# Patient Record
Sex: FEMALE | Race: WHITE
[De-identification: names, ages, dates, MRNs, and addresses within clinical notes are randomized per-mention and may not be internally consistent; named-entity substitution may affect disease eponyms.]

---

## 2017-05-26 ENCOUNTER — HOSPITAL ENCOUNTER (EMERGENCY)
Dept: HOSPITAL 47 - EC | Age: 22
Discharge: HOME | End: 2017-05-26
Payer: COMMERCIAL

## 2017-05-26 VITALS — RESPIRATION RATE: 18 BRPM

## 2017-05-26 VITALS — DIASTOLIC BLOOD PRESSURE: 96 MMHG | SYSTOLIC BLOOD PRESSURE: 157 MMHG | HEART RATE: 78 BPM | TEMPERATURE: 98.8 F

## 2017-05-26 DIAGNOSIS — E86.0: ICD-10-CM

## 2017-05-26 DIAGNOSIS — R06.02: ICD-10-CM

## 2017-05-26 DIAGNOSIS — R05: ICD-10-CM

## 2017-05-26 DIAGNOSIS — F17.200: ICD-10-CM

## 2017-05-26 DIAGNOSIS — R07.89: Primary | ICD-10-CM

## 2017-05-26 LAB
ALP SERPL-CCNC: 77 U/L (ref 38–126)
ALT SERPL-CCNC: 21 U/L (ref 9–52)
ANION GAP SERPL CALC-SCNC: 9 MMOL/L
APTT BLD: 25.1 SEC (ref 22–30)
AST SERPL-CCNC: 16 U/L (ref 14–36)
BASOPHILS # BLD AUTO: 0.1 K/UL (ref 0–0.2)
BASOPHILS NFR BLD AUTO: 1 %
BUN SERPL-SCNC: 22 MG/DL (ref 7–17)
CALCIUM SPEC-MCNC: 10 MG/DL (ref 8.4–10.2)
CH: 32.4
CHCM: 35.1
CHLORIDE SERPL-SCNC: 105 MMOL/L (ref 98–107)
CO2 SERPL-SCNC: 24 MMOL/L (ref 22–30)
EOSINOPHIL # BLD AUTO: 0.2 K/UL (ref 0–0.7)
EOSINOPHIL NFR BLD AUTO: 3 %
ERYTHROCYTE [DISTWIDTH] IN BLOOD BY AUTOMATED COUNT: 4.54 M/UL (ref 3.8–5.4)
ERYTHROCYTE [DISTWIDTH] IN BLOOD: 12.2 % (ref 11.5–15.5)
GLUCOSE SERPL-MCNC: 92 MG/DL (ref 74–99)
HCT VFR BLD AUTO: 41.9 % (ref 34–46)
HDW: 2.42
HGB BLD-MCNC: 14.7 GM/DL (ref 11.4–16)
INR PPP: 1.1 (ref ?–1.1)
LUC NFR BLD AUTO: 2 %
LYMPHOCYTES # SPEC AUTO: 2.1 K/UL (ref 1–4.8)
LYMPHOCYTES NFR SPEC AUTO: 24 %
MAGNESIUM SPEC-SCNC: 2 MG/DL (ref 1.6–2.3)
MCH RBC QN AUTO: 32.3 PG (ref 25–35)
MCHC RBC AUTO-ENTMCNC: 35 G/DL (ref 31–37)
MCV RBC AUTO: 92.4 FL (ref 80–100)
MONOCYTES # BLD AUTO: 0.5 K/UL (ref 0–1)
MONOCYTES NFR BLD AUTO: 5 %
NEUTROPHILS # BLD AUTO: 5.9 K/UL (ref 1.3–7.7)
NEUTROPHILS NFR BLD AUTO: 66 %
NON-AFRICAN AMERICAN GFR(MDRD): >60
PARTICLE COUNT: (no result)
PH UR: 7 [PH] (ref 5–8)
POTASSIUM SERPL-SCNC: 4.3 MMOL/L (ref 3.5–5.1)
PROT SERPL-MCNC: 7.6 G/DL (ref 6.3–8.2)
PT BLD: 10.7 SEC (ref 9–12)
SODIUM SERPL-SCNC: 138 MMOL/L (ref 137–145)
SP GR UR: 1.02 (ref 1–1.03)
SQUAMOUS UR QL AUTO: 19 /HPF (ref 0–4)
UA BILLING (MACRO VS. MICRO): (no result)
UROBILINOGEN UR QL STRIP: <2 MG/DL (ref ?–2)
WBC # BLD AUTO: 0.16 10*3/UL
WBC # BLD AUTO: 8.9 K/UL (ref 3.8–10.6)
WBC #/AREA URNS HPF: 11 /HPF (ref 0–5)
WBC (PEROX): 8.7

## 2017-05-26 PROCEDURE — 85025 COMPLETE CBC W/AUTO DIFF WBC: CPT

## 2017-05-26 PROCEDURE — 96361 HYDRATE IV INFUSION ADD-ON: CPT

## 2017-05-26 PROCEDURE — 81001 URINALYSIS AUTO W/SCOPE: CPT

## 2017-05-26 PROCEDURE — 93005 ELECTROCARDIOGRAM TRACING: CPT

## 2017-05-26 PROCEDURE — 85379 FIBRIN DEGRADATION QUANT: CPT

## 2017-05-26 PROCEDURE — 85610 PROTHROMBIN TIME: CPT

## 2017-05-26 PROCEDURE — 99285 EMERGENCY DEPT VISIT HI MDM: CPT

## 2017-05-26 PROCEDURE — 84484 ASSAY OF TROPONIN QUANT: CPT

## 2017-05-26 PROCEDURE — 85730 THROMBOPLASTIN TIME PARTIAL: CPT

## 2017-05-26 PROCEDURE — 71020: CPT

## 2017-05-26 PROCEDURE — 80053 COMPREHEN METABOLIC PANEL: CPT

## 2017-05-26 PROCEDURE — 83735 ASSAY OF MAGNESIUM: CPT

## 2017-05-26 PROCEDURE — 81025 URINE PREGNANCY TEST: CPT

## 2017-05-26 PROCEDURE — 96374 THER/PROPH/DIAG INJ IV PUSH: CPT

## 2017-05-26 PROCEDURE — 94640 AIRWAY INHALATION TREATMENT: CPT

## 2017-05-26 PROCEDURE — 36415 COLL VENOUS BLD VENIPUNCTURE: CPT

## 2017-05-26 NOTE — XR
EXAMINATION TYPE: XR chest 2V

 

DATE OF EXAM: 5/26/2017 8:37 PM

 

COMPARISON: NONE

 

HISTORY: Chest pain

 

TECHNIQUE:  Frontal and lateral views of the chest are obtained.

 

FINDINGS:  Heart and mediastinum are normal. Lungs are clear. Diaphragm is normal. Bony thorax is int
act. There are chest leads.

 

IMPRESSION:  Normal chest.

## 2017-05-26 NOTE — ED
Chest Pain HPI





- General


Chief Complaint: Chest Pain


Stated Complaint: Chest Pain


Time Seen by Provider: 05/26/17 19:47


Source: patient


Mode of arrival: EMS





- History of Present Illness


Initial Comments: 





This patient is a 22-year-old woman who presents with complaint of 3-4 days now 

of chest tightness.  She indicates it is across the entire chest.  She states 

that she had first noticed it at work.  She works at [employer] and again it 

came on 3-4 days ago.  She states that it is constant, mild to moderate, and 

she has not noted any worsening or relieving factors.  She has had a little bit 

of shortness of breath and she states she is coughing with some light green 

sputum.  Patient denies any anginal equivalent, no diaphoresis, nausea or 

vomiting, palpitations, lightheadedness or syncope.  The patient had gone to 

medic expess about this was given aspirin and sent here for further evaluation.


MD Complaint: chest pain


-: days(s)


Onset: during rest


Pain Location: left chest, right chest


Pain Radiation: none


Severity: mild


Quality: tightness





- Related Data


 Home Medications











 Medication  Instructions  Recorded  Confirmed


 


Aspirin-Acet-Caff [id number] 2 tab PO BID PRN 05/26/17 05/26/17





[Excedrin]   


 


Ibuprofen [Motrin] 800 mg PO DAILY PRN 05/26/17 05/26/17


 


diphenhydrAMINE HCL [Benadryl] 100 mg PO HS PRN 05/26/17 05/26/17








 Previous Rx's











 Medication  Instructions  Recorded


 


Albuterol Inhaler [Ventolin Hfa 1 - 2 puff INHALATION Q6HR PRN #1 05/26/17





Inhaler] inhaler 











 Allergies











Allergy/AdvReac Type Severity Reaction Status Date / Time


 


No Known Allergies Allergy   Verified 05/26/17 20:11














Review of Systems


ROS Statement: 


Those systems with pertinent positive or pertinent negative responses have been 

documented in the HPI.





ROS Other: All systems not noted in ROS Statement are negative.


Constitutional: Reports: fever.  Denies: chills


Respiratory: Reports: as per HPI, cough, dyspnea.  Denies: wheezes, hemoptysis


Cardiovascular: Reports: as per HPI, chest pain.  Denies: palpitations, dyspnea 

on exertion, orthopnea, edema, syncope


Gastrointestinal: Denies: abdominal pain, nausea, vomiting, melena, hematochezia


Genitourinary: Denies: dysuria, hematuria


Musculoskeletal: Denies: back pain


Skin: Denies: rash


Neurological: Denies: headache, weakness, numbness





EKG Findings





- EKG Comments:


EKG Findings:: The 12-lead EKG shows anus rhythm with what appear to be 

relatively frequent reentrant ventricular beats.  Rate is 83 bpm.





- EKG Results:


EKG: interpreted by WILLIED, sinus rhythm (Rate 83 bpm), normal axis, normal QRS, 

normal ST/T





Past Medical History


Past Medical History: No Reported History


History of Any Multi-Drug Resistant Organisms: None Reported


Past Surgical History: No Surgical Hx Reported


Past Psychological History: Anxiety


Smoking Status: Current every day smoker


Past Alcohol Use History: None Reported


Past Drug Use History: None Reported





General Exam


General appearance: alert, in no apparent distress


Head exam: Present: atraumatic, normocephalic


Eye exam: Present: normal appearance.  Absent: scleral icterus, conjunctival 

injection


ENT exam: Present: normal oropharynx


Neck exam: Present: normal inspection, full ROM


Respiratory exam: Present: normal lung sounds bilaterally.  Absent: respiratory 

distress, wheezes, rales, rhonchi, stridor


Cardiovascular Exam: Present: regular rate, normal rhythm, normal heart sounds.

  Absent: systolic murmur, diastolic murmur, rubs, gallop


GI/Abdominal exam: Present: soft, normal bowel sounds.  Absent: distended, 

tenderness, guarding, rebound, mass


Extremities exam: Present: normal inspection, normal capillary refill.  Absent: 

pedal edema, calf tenderness


Back exam: Present: normal inspection.  Absent: CVA tenderness (R), CVA 

tenderness (L)


Neurological exam: Present: alert


Psychiatric exam: Present: anxious


Skin exam: Present: warm, dry, intact, normal color.  Absent: rash





Course


 Vital Signs











  05/26/17 05/26/17 05/26/17





  19:45 20:59 21:07


 


Temperature 99.0 F  


 


Pulse Rate 43 L 90 90


 


Respiratory 18  





Rate   


 


Blood Pressure 123/67  


 


O2 Sat by Pulse 99  





Oximetry   














Disposition


Clinical Impression: 


 Chest pain, Dehydration





Disposition: HOME SELF-CARE


Condition: Good


Instructions:  Chest Pain (ED)


Prescriptions: 


Albuterol Inhaler [Ventolin Hfa Inhaler] 1 - 2 puff INHALATION Q6HR PRN #1 

inhaler


 PRN Reason: Wheezing


Referrals: 


None,Stated [Primary Care Provider] - 1-2 days


Ryan Zuniga MD [STAFF PHYSICIAN] - 1-2 days

## 2018-02-23 ENCOUNTER — HOSPITAL ENCOUNTER (OUTPATIENT)
Dept: HOSPITAL 47 - RADUSMAIN | Age: 23
Discharge: HOME | End: 2018-02-23
Payer: COMMERCIAL

## 2018-02-23 DIAGNOSIS — Z36.89: Primary | ICD-10-CM

## 2018-02-23 DIAGNOSIS — Z3A.12: ICD-10-CM

## 2018-02-23 LAB
ERYTHROCYTE [DISTWIDTH] IN BLOOD BY AUTOMATED COUNT: 4.02 M/UL (ref 3.8–5.4)
ERYTHROCYTE [DISTWIDTH] IN BLOOD: 11.2 % (ref 11.5–15.5)
GLUCOSE SERPL-MCNC: 51 MG/DL (ref 74–99)
HCT VFR BLD AUTO: 38 % (ref 34–46)
HGB BLD-MCNC: 12.5 GM/DL (ref 11.4–16)
MCH RBC QN AUTO: 31 PG (ref 25–35)
MCHC RBC AUTO-ENTMCNC: 32.8 G/DL (ref 31–37)
MCV RBC AUTO: 94.4 FL (ref 80–100)
PLATELET # BLD AUTO: 299 K/UL (ref 150–450)
WBC # BLD AUTO: 10.8 K/UL (ref 3.8–10.6)

## 2018-02-23 PROCEDURE — 76801 OB US < 14 WKS SINGLE FETUS: CPT

## 2018-02-23 PROCEDURE — 87340 HEPATITIS B SURFACE AG IA: CPT

## 2018-02-23 PROCEDURE — 86901 BLOOD TYPING SEROLOGIC RH(D): CPT

## 2018-02-23 PROCEDURE — 86777 TOXOPLASMA ANTIBODY: CPT

## 2018-02-23 PROCEDURE — 86900 BLOOD TYPING SEROLOGIC ABO: CPT

## 2018-02-23 PROCEDURE — 85027 COMPLETE CBC AUTOMATED: CPT

## 2018-02-23 PROCEDURE — 82565 ASSAY OF CREATININE: CPT

## 2018-02-23 PROCEDURE — 76813 OB US NUCHAL MEAS 1 GEST: CPT

## 2018-02-23 PROCEDURE — 82947 ASSAY GLUCOSE BLOOD QUANT: CPT

## 2018-02-23 PROCEDURE — 86778 TOXOPLASMA ANTIBODY IGM: CPT

## 2018-02-23 PROCEDURE — 86592 SYPHILIS TEST NON-TREP QUAL: CPT

## 2018-02-23 PROCEDURE — 86762 RUBELLA ANTIBODY: CPT

## 2018-02-23 PROCEDURE — 86850 RBC ANTIBODY SCREEN: CPT

## 2018-02-24 NOTE — US
EXAMINATION TYPE: US OB <= 14 wk fetus

 

DATE OF EXAM: 2/23/2018

 

COMPARISON: NONE

 

CLINICAL HISTORY: Z36 confirm dates. Confirm dates 

 

EXAM PERFORMED:  Transabdominal (TA)

 

EXAM MEASUREMENTS:

 

GESTATIONAL AGE / DATING

 

Physician Established: Not yet established 

Dates by LMP: (6 weeks/1 days)  

** EDC: 10/18/18

Dates by First Scan:  No previous this is first scan  

Dates by Current Scan for: (12 weeks/0 days)  

** EDC: 09/07/18

 

MATERNAL ANATOMY 

 

Uterus: 10.6 x 6.3 x 8.8cm

Right Ovary: 2.0 x 0.8 x 1.3cm

Left Ovary: 3.2 x 1.3 x 1.6cm

Post CDS / Adnexa: appears wnl, large amount of peristalsing bowel left adnexa 

Presence of free fluid: no

Presence of corpus luteal cyst: yes, hypoechoic area right ovary = 1.4 x 1.4 x 1.4cm  

 

 

GESTATION / FETAL SURVEY

 

CRL: 5.5cm (12 weeks/0 days)

Yolk Sac (normal less than 6mm): not seen

Heart Rate: 160 bpm

Rhythm:  Normal

IUP:  Viable IUP

Nuchal Translucency 10-14wks (normal less than 3mm): 1.7mm

 

Date of LMP: unknown - possibly 01/11/18 

Beta HcG (if available): Not available at this time

 

Single live intrauterine gestation is seen in the gestational sac and fetal pole is identified. Yolk 
sac is not clearly present. No free fluid is seen in pelvic cul-de-sac.

 

Both ovaries are identified. Within the right ovary there is a peripheral 1.4 cm hypoechoic oval lesi
on felt to reflect corpus luteal cyst. No suspicious extra ovarian adnexal masses are identified.

 

IMPRESSION:

Single live intrauterine gestation is confirmed, mean crown-rump length is 5.5 cm corresponding to 12
 weeks 0 day old fetus.

## 2018-02-27 LAB
T GONDII IGG SER-ACNC: <3 IU/ML (ref ?–7.2)
T GONDII IGM SER-ACNC: 3.2 AU/ML (ref ?–8)

## 2018-09-09 ENCOUNTER — HOSPITAL ENCOUNTER (INPATIENT)
Dept: HOSPITAL 47 - FBPOP | Age: 23
LOS: 1 days | Discharge: HOME | End: 2018-09-10
Attending: OBSTETRICS & GYNECOLOGY | Admitting: OBSTETRICS & GYNECOLOGY
Payer: COMMERCIAL

## 2018-09-09 VITALS — RESPIRATION RATE: 16 BRPM

## 2018-09-09 DIAGNOSIS — Z3A.40: ICD-10-CM

## 2018-09-09 DIAGNOSIS — O48.0: Primary | ICD-10-CM

## 2018-09-09 DIAGNOSIS — F17.200: ICD-10-CM

## 2018-09-09 DIAGNOSIS — F41.9: ICD-10-CM

## 2018-09-09 LAB
BASOPHILS # BLD AUTO: 0.1 K/UL (ref 0–0.2)
BASOPHILS NFR BLD AUTO: 0 %
EOSINOPHIL # BLD AUTO: 0.1 K/UL (ref 0–0.7)
EOSINOPHIL NFR BLD AUTO: 1 %
ERYTHROCYTE [DISTWIDTH] IN BLOOD BY AUTOMATED COUNT: 4.01 M/UL (ref 3.8–5.4)
ERYTHROCYTE [DISTWIDTH] IN BLOOD: 12.6 % (ref 11.5–15.5)
HCT VFR BLD AUTO: 37.3 % (ref 34–46)
HGB BLD-MCNC: 12.8 GM/DL (ref 11.4–16)
LYMPHOCYTES # SPEC AUTO: 3 K/UL (ref 1–4.8)
LYMPHOCYTES NFR SPEC AUTO: 27 %
MCH RBC QN AUTO: 31.8 PG (ref 25–35)
MCHC RBC AUTO-ENTMCNC: 34.2 G/DL (ref 31–37)
MCV RBC AUTO: 92.8 FL (ref 80–100)
MONOCYTES # BLD AUTO: 0.7 K/UL (ref 0–1)
MONOCYTES NFR BLD AUTO: 6 %
NEUTROPHILS # BLD AUTO: 6.8 K/UL (ref 1.3–7.7)
NEUTROPHILS NFR BLD AUTO: 62 %
PLATELET # BLD AUTO: 270 K/UL (ref 150–450)
WBC # BLD AUTO: 11.1 K/UL (ref 3.8–10.6)

## 2018-09-09 PROCEDURE — 59025 FETAL NON-STRESS TEST: CPT

## 2018-09-09 PROCEDURE — 88307 TISSUE EXAM BY PATHOLOGIST: CPT

## 2018-09-09 PROCEDURE — 00HU33Z INSERTION OF INFUSION DEVICE INTO SPINAL CANAL, PERCUTANEOUS APPROACH: ICD-10-PCS

## 2018-09-09 PROCEDURE — 84112 EVAL AMNIOTIC FLUID PROTEIN: CPT

## 2018-09-09 PROCEDURE — 85025 COMPLETE CBC W/AUTO DIFF WBC: CPT

## 2018-09-09 PROCEDURE — 0HQ9XZZ REPAIR PERINEUM SKIN, EXTERNAL APPROACH: ICD-10-PCS

## 2018-09-09 PROCEDURE — 3E0R3BZ INTRODUCTION OF ANESTHETIC AGENT INTO SPINAL CANAL, PERCUTANEOUS APPROACH: ICD-10-PCS

## 2018-09-09 PROCEDURE — 99213 OFFICE O/P EST LOW 20 MIN: CPT

## 2018-09-09 RX ADMIN — IBUPROFEN PRN MG: 600 TABLET ORAL at 12:44

## 2018-09-09 RX ADMIN — POTASSIUM CHLORIDE SCH MLS/HR: 14.9 INJECTION, SOLUTION INTRAVENOUS at 07:11

## 2018-09-09 RX ADMIN — POTASSIUM CHLORIDE SCH MLS/HR: 14.9 INJECTION, SOLUTION INTRAVENOUS at 05:06

## 2018-09-09 RX ADMIN — DOCUSATE SODIUM AND SENNOSIDES SCH EACH: 50; 8.6 TABLET ORAL at 19:19

## 2018-09-09 RX ADMIN — POTASSIUM CHLORIDE SCH MLS/HR: 14.9 INJECTION, SOLUTION INTRAVENOUS at 06:23

## 2018-09-09 RX ADMIN — POTASSIUM CHLORIDE SCH MLS/HR: 14.9 INJECTION, SOLUTION INTRAVENOUS at 04:44

## 2018-09-09 RX ADMIN — IBUPROFEN PRN MG: 600 TABLET ORAL at 19:20

## 2018-09-09 NOTE — P.PROBDLV
Vaginal Delivery Note





- .


Vaginal Delivery Note: 





Normal vaginal delivery viable male infant Apgars 8 and 9 at 1013 hrs.





Please see dictated H&P for intimate details of this patient's admission.  

Brief summary this is a 23-year-old  2 para 1 female 40-2/7 weeks 

gestation admitted to labor and delivery with complaints of regular painful 

contractions.  On admission patient is 2-3 cm dilated and has artificial 

rupture membranes for moderate meconium-stained fluid.  Fetal heart tones are 

reactive.  Patient does get an epidural for pain control.  Patient does stay at 

4 cm for an hour to and therefore does have some Pitocin augmentation of labor.

  Labor thereafter progresses quickly patient gets to complete.  She pushes 

approximately 3 times pushes the fetal head over the intact perineum.  

Pediatricians are present for delivery.  There is a very loose nuchal cord and 

patient has spontaneous delivery the anterior and posterior shoulder and rest 

this infant's body.  This infant has spontaneous respiration and good cry and 

grossly appears normal.  Apgars are 8 and 9 delivery time is 1013 hrs.  Infant 

is late on the mother's abdomen.  After the cord is done pulsating the 

umbilical cord is doubly clamped and cut and appears to be trivascular.  The 

placenta is then spontaneously delivered intact.  Of note is slight meconium-

stained.  Inspection of perineum shows a first-degree vaginal laceration the 

right side which is only about 2 cm but is reapproximated with 3-0 Vicryl usual 

fashion.  There are no perineal lacerations.  Infant and mother are stable 

delivery room.  There are no complications.  All counts are correct 3.

## 2018-09-09 NOTE — P.HPOB
History of Present Illness


H&P Date: 18


Chief Complaint: Contractions





This patient is a 23-year-old with a 2 para 1 female estimated date of 

confinement 2018 estimated gestational age 40-2/7 weeks who presents to 

labor and delivery with complaints of painful contractions.  Patient was seen 

by Dr. Moreno in the office was 1-2 cm dilated is now 2-3 cm dilated in active 

labor.  Prenatal care is complicated by tobacco use, otherwise appears 

uncomplicated.





Review of Systems


Gastrointestinal: Reports heartburn


Genitourinary: Reports pregnant


Menstruation: Reports amenorrhea





Past Medical History


Past Medical History: No Reported History


History of Any Multi-Drug Resistant Organisms: None Reported


Past Surgical History: No Surgical Hx Reported


Past Anesthesia/Blood Transfusion Reactions: No Reported Reaction


Past Psychological History: Anxiety


Smoking Status: Current every day smoker


Past Alcohol Use History: None Reported


Past Drug Use History: None Reported





- Past Family History


  ** Mother


Family Medical History: No Reported History





Medications and Allergies


 Home Medications











 Medication  Instructions  Recorded  Confirmed  Type


 


Pnv,Calcium 72/Iron/Folic Acid 1 each PO DAILY 18 History





[Prenatal Plus Tablet]    











 Allergies











Allergy/AdvReac Type Severity Reaction Status Date / Time


 


No Known Allergies Allergy   Verified 18 04:10














Exam


 Vital Signs











  Temp Pulse Resp BP


 


 18 04:26  97.8 F  102 H  18  118/69


 


 18 04:15  97.8 F  102 H  18  118/69








 Intake and Output











 18





 14:59 22:59 06:59


 


Other:   


 


  # Voids   1


 


  Weight   61.235 kg














- OBG Physical Exam


Abdomen: bowel sounds normal, no diffuse tenderness, no bruit present, no 

guarding noted, no hepatomegaly, no splenomegaly, no mass


Vulva: both: normal


Vagina: normal moisture, no discharge


Cervix: no lesion (Cervix is 2-3 cm dilated approximately 50% effaced.  

Artificial rupture membranes shows moderate meconium-stained fluid), no 

discharge


Uterus: enlarged





Results





Prenatal blood work shows she is oh positive, rubella immune, hepatitis B is 

negative, group B strep was negative.  Ultrasound has shown small for 

gestational age.





Assessment and Plan


Assessment: 





This is a 23-year-old  2 para 1 female 40-2/7 weeks gestation in early 

active labor with moderate meconium-stained fluid.  Plan is anticipate vaginal 

delivery.  We will alert the nurse anesthetist as to the meconium-stained 

fluid.  Fetal heart tones are reactive at this time.


(1) Post-dates pregnancy


Current Visit: Yes   Status: Acute   Code(s): O48.0 - POST-TERM PREGNANCY   

SNOMED Code(s): 82509606


   





(2) Meconium in amniotic fluid


Current Visit: Yes   Status: Acute   Code(s): P96.83 - MECONIUM STAINING   

SNOMED Code(s): 044654149

## 2018-09-10 VITALS — SYSTOLIC BLOOD PRESSURE: 122 MMHG | TEMPERATURE: 98.6 F | HEART RATE: 78 BPM | DIASTOLIC BLOOD PRESSURE: 63 MMHG

## 2018-09-10 RX ADMIN — DOCUSATE SODIUM AND SENNOSIDES SCH: 50; 8.6 TABLET ORAL at 09:39

## 2018-09-10 RX ADMIN — IBUPROFEN PRN MG: 600 TABLET ORAL at 10:15

## 2018-09-10 RX ADMIN — IBUPROFEN PRN MG: 600 TABLET ORAL at 01:58

## 2018-09-10 NOTE — P.MSEPDOC
Presenting Problems





- Arrival Data


Date of Arrival on Unit: 18


Time of Arrival on Unit: 04:22


Mode of Transport: Wheelchair





- Complaint


OB-Reason for Admission/Chief Complaint: Possible Onset of Labor


Comment: Contractions started at approximately 0100





Prenatal Medical History





- Pregnancy Information


: 2


Para: 1


Term: 1


: 0


Abortions: Spontaneous or Elective: 0


Number of Living Children: 1





- Gestational Age


Gestational Age by PERLA (wks/days): 40 Weeks and 2 Days





- Prenatal History


Pregnancy Complications: Smoker





Review of Systems





- Review of Systems


Constitutional: No problems


Breast: No problems


ENT: No problems


Cardiovascular: No problems


Respiratory: No problems


Gastrointestinal: No problems


Genitourinary: No problems


Musculoskeletal: No problems


Neurological: No problems


Skin: No problems





Vital Signs





- Temperature


Temperature: 97.9 F


Temperature Source: Oral





- Pulse


  ** Right


Pulse Rate: 67


Pulse Assessment Method: Automatic Cuff





- Respirations


Respiratory Rate: 16


Oxygen Delivery Method: Room Air





- Blood Pressure


  ** Right Arm


Blood Pressure: 141/77


Blood Pressure Mean: 98


Blood Pressure Source: Automatic Cuff





Medical Screen Scoring (Pre)





- Cervical Exam


Dilation: 1-3 cm = 1


Effacement: More than 50% = 2


Membranes: Intact





- Uterine Contractions


Frequency: > or = 36 weeks =2


Duration: > 40 seconds = 2


Intensity: N/A





- Maternal Vital Signs


Maternal Temperature: N/A


Maternal Blood Pressure: N/A


Signs of Preeclampsia: N/A


Maternal Respirations: N/A





- Pain Assessment


Pain Location and Character: Abdomen


Pain Scale Used: Numeric (1 - 10)


Pain Intensity: 10





- Maternal Trauma


Maternal Trauma: N/A





- Fetal Assessment


Baseline FHR: 125


Fetal Heart Rate - NICHD Category: Category I (Normal) = 0


NST: Reactive


Fetal Position: N/A


Fetal Station: N/A





- Total Score


Total Score (Pre): 7





- Level of Risk


Level of Risk: Medium (6-9)





Physician Notification (Pre)





- Physician Notified


Physician Notified Date: 18


Physician Notified Time: 04:22


Physician/Practitioner Notifed:: Dr. Lou


New Order Received: Yes





- Notification Comment


Comment: Admit patient for labor, patient may have an epidural if and when 

needed.





Disposition





- Disposition


OB Disposition: Admit, LDRP Suite


Transferred to:: Suite 3


I agree with the RN Medical Screening Exam: Yes


Risk & Benefit of care provided described in d/c instruction: Yes


Diagnosis: ENCOUNTER FOR FULL-TERM UNCOMPLICATED DELIVERY

## 2018-09-10 NOTE — P.DS
Providers


Date of admission: 


18 04:21





Expected date of discharge: 09/10/18


Attending physician: 


Olive Moreno





Primary care physician: 


Stated None





Hospital Course: 





This is a 23-year-old female  2 para 1 at 40-2/7 weeks who presented 

with active labor.  She delivered vaginally a viable male infant on 2018 

with Apgar scores of 8 at 1 minute and 9 at 5 minutes and infant weight of 6 

lbs. 0 oz.  Please see dictated delivery note for more details.  Her postpartum 

course has been uncomplicated.  She is bottle feeding.  Lochia is decreasing.  

Pain is fairly well controlled with ibuprofen.  Vital signs are stable.  

Abdomen is soft with fundus firm and nontender.  Extremities show negative 

Homans.  Impression is status post vaginal delivery postpartum day #1.  Plan is 

to discharge home today.  Routine postpartum instructions are given.  She is 

will be given a prescription for ibuprofen.  She is advised to follow up in the 

office in 6 weeks for a postpartum check.  She is advised to call the office if 

she has any further questions or concerns prior to her appointment time.


Procedures: 





Spontaneous vaginal delivery of a viable male infant on 2018


Patient Condition at Discharge: Stable





Plan - Discharge Summary


New Discharge Prescriptions: 


New


   Ibuprofen [Motrin] 600 mg PO Q6HR PRN #60 tab


     PRN Reason: Mild Pain Or Fever >= 100.5





Continue


   Pnv,Calcium 72/Iron/Folic Acid [Prenatal Plus Tablet] 1 each PO DAILY


Discharge Medication List





Pnv,Calcium 72/Iron/Folic Acid [Prenatal Plus Tablet] 1 each PO DAILY 18 [

History]


Ibuprofen [Motrin] 600 mg PO Q6HR PRN #60 tab 09/10/18 [Rx]








Follow up Appointment(s)/Referral(s): 


Olive Moreno DO [Doctor of Osteopathic Medicine] - 6 Weeks


Activity/Diet/Wound Care/Special Instructions: 


Postpartum Instructions





1.  Do not begin any exercise program for 3 weeks.


2.  Do not resume sexual relations for 3 weeks or longer if uncomfortable.


3.  You may take tub baths or showers at any time.


4.  You may use tampons if desired after 3 weeks.


5.  Keep the area of episiotomy (stitches) clean and dry.


6.  If you are not nursing, wear a good fitting, supportive bra during the day 

and limit fluid intake for at least 1 week to prevent breast engorgement.


7.  Call the office, [phone], within the next week to make appointment for 

your 6 week checkup if it has not already been made.


8.  Report any of the following occurrences to the doctor promptly:


     a.  Heavy, excessive bleeding


     b.  Chills, fever


     c.  Burning or frequency of urination


     d.  Pain or redness and breasts if nursing


     e.  Increasing pain or swelling in episiotomy (stitches).











In addition to the above instructions, the following additional should be 

followed:


1.  No heavy lifting or straining (exercising) until after 6 week checkup.


2.  Keep abdominal incision clean and dry: You may wear a dressing if more 

comfortable.


3.  Make office appointment for 10 days after going home or as instructed by 

her doctor.





Discharge Disposition: HOME SELF-CARE

## 2022-05-23 ENCOUNTER — HOSPITAL ENCOUNTER (INPATIENT)
Dept: HOSPITAL 47 - FBPOP | Age: 27
LOS: 2 days | Discharge: HOME | End: 2022-05-25
Attending: OBSTETRICS & GYNECOLOGY | Admitting: OBSTETRICS & GYNECOLOGY
Payer: COMMERCIAL

## 2022-05-23 DIAGNOSIS — F41.9: ICD-10-CM

## 2022-05-23 DIAGNOSIS — Z3A.40: ICD-10-CM

## 2022-05-23 DIAGNOSIS — F31.9: ICD-10-CM

## 2022-05-23 DIAGNOSIS — F17.210: ICD-10-CM

## 2022-05-23 LAB
BASOPHILS # BLD AUTO: 0 K/UL (ref 0–0.2)
BASOPHILS NFR BLD AUTO: 0 %
EOSINOPHIL # BLD AUTO: 0.1 K/UL (ref 0–0.7)
EOSINOPHIL NFR BLD AUTO: 0 %
ERYTHROCYTE [DISTWIDTH] IN BLOOD BY AUTOMATED COUNT: 3.92 M/UL (ref 3.8–5.4)
ERYTHROCYTE [DISTWIDTH] IN BLOOD: 13 % (ref 11.5–15.5)
HCT VFR BLD AUTO: 37.1 % (ref 34–46)
HGB BLD-MCNC: 12.8 GM/DL (ref 11.4–16)
LYMPHOCYTES # SPEC AUTO: 1.9 K/UL (ref 1–4.8)
LYMPHOCYTES NFR SPEC AUTO: 14 %
MCH RBC QN AUTO: 32.7 PG (ref 25–35)
MCHC RBC AUTO-ENTMCNC: 34.5 G/DL (ref 31–37)
MCV RBC AUTO: 94.6 FL (ref 80–100)
MONOCYTES # BLD AUTO: 0.6 K/UL (ref 0–1)
MONOCYTES NFR BLD AUTO: 4 %
NEUTROPHILS # BLD AUTO: 10 K/UL (ref 1.3–7.7)
NEUTROPHILS NFR BLD AUTO: 79 %
PLATELET # BLD AUTO: 241 K/UL (ref 150–450)
WBC # BLD AUTO: 12.8 K/UL (ref 3.8–10.6)

## 2022-05-23 PROCEDURE — 85025 COMPLETE CBC W/AUTO DIFF WBC: CPT

## 2022-05-23 PROCEDURE — 86901 BLOOD TYPING SEROLOGIC RH(D): CPT

## 2022-05-23 PROCEDURE — 59025 FETAL NON-STRESS TEST: CPT

## 2022-05-23 PROCEDURE — 86900 BLOOD TYPING SEROLOGIC ABO: CPT

## 2022-05-23 PROCEDURE — 99213 OFFICE O/P EST LOW 20 MIN: CPT

## 2022-05-23 PROCEDURE — 86850 RBC ANTIBODY SCREEN: CPT

## 2022-05-23 RX ADMIN — POTASSIUM CHLORIDE SCH MLS/HR: 14.9 INJECTION, SOLUTION INTRAVENOUS at 23:00

## 2022-05-24 PROCEDURE — 10907ZC DRAINAGE OF AMNIOTIC FLUID, THERAPEUTIC FROM PRODUCTS OF CONCEPTION, VIA NATURAL OR ARTIFICIAL OPENING: ICD-10-PCS

## 2022-05-24 PROCEDURE — 4A0HXCZ MEASUREMENT OF PRODUCTS OF CONCEPTION, CARDIAC RATE, EXTERNAL APPROACH: ICD-10-PCS

## 2022-05-24 PROCEDURE — 3E033VJ INTRODUCTION OF OTHER HORMONE INTO PERIPHERAL VEIN, PERCUTANEOUS APPROACH: ICD-10-PCS

## 2022-05-24 RX ADMIN — POTASSIUM CHLORIDE SCH MLS/HR: 14.9 INJECTION, SOLUTION INTRAVENOUS at 08:41

## 2022-05-24 RX ADMIN — FAMOTIDINE SCH MG: 10 INJECTION, SOLUTION INTRAVENOUS at 08:33

## 2022-05-24 RX ADMIN — IBUPROFEN PRN MG: 600 TABLET ORAL at 15:14

## 2022-05-24 RX ADMIN — POTASSIUM CHLORIDE SCH MLS/HR: 14.9 INJECTION, SOLUTION INTRAVENOUS at 12:08

## 2022-05-24 RX ADMIN — ACETAMINOPHEN PRN MG: 325 TABLET, FILM COATED ORAL at 18:31

## 2022-05-24 RX ADMIN — BUTORPHANOL TARTRATE PRN MG: 1 INJECTION, SOLUTION INTRAMUSCULAR; INTRAVENOUS at 07:59

## 2022-05-24 RX ADMIN — FAMOTIDINE SCH MG: 10 INJECTION, SOLUTION INTRAVENOUS at 00:29

## 2022-05-24 RX ADMIN — POTASSIUM CHLORIDE SCH MLS/HR: 14.9 INJECTION, SOLUTION INTRAVENOUS at 01:26

## 2022-05-24 RX ADMIN — DOCUSATE SODIUM AND SENNOSIDES SCH EACH: 50; 8.6 TABLET ORAL at 22:34

## 2022-05-24 RX ADMIN — IBUPROFEN PRN MG: 600 TABLET ORAL at 22:35

## 2022-05-24 RX ADMIN — BUTORPHANOL TARTRATE PRN MG: 1 INJECTION, SOLUTION INTRAMUSCULAR; INTRAVENOUS at 01:25

## 2022-05-24 NOTE — P.PROBDLV
Vaginal Delivery Note





- .


Vaginal Delivery Note: 





The patient breast to complete dilation after oxytocin augmentation of labor and

artificial rupture of membranes with clear fluid noted.  She did receive 

epidural anesthesia.  Once reaching complete, she began pushing.  Infant's head 

came to a crown.  With one further push, the infant's head delivered across the 

perineum followed by the anterior shoulder.  Nuchal cord times one was reduced 

around the infant's head.  The remainder the infant easily delivered after that.

 Nose and mouth were bulb suctioned after delivery.  Infant was placed on 

mother's abdomen.  Cord was clamped and cut.  Infant was taken to warmer for 

evaluation.  A viable female infant is noted with Apgar scores of 9 at 1 minute 

and 9 at 5 minutes and infant weight of 6 lbs. 5 oz.  Placenta delivered shortly

thereafter, intact, with a three-vessel cord.  Uterus contracted well after 

oxytocin was given and uterine massage was carried out.  Inspection of the 

perineum revealed a very minor perineal abrasion but no active bleeding noted.  

Estimated blood loss is approximately 100 mL's.  Both mother and infant are in 

stable condition.

## 2022-05-24 NOTE — P.HPOB
History of Present Illness


H&P Date: 22


Chief Complaint: contractions





27 year old  presents at 40 weeks 2 days complaining of contractions. Her 

cervix was 1/60/-2 and she was iraida every few minutes. Fetal heart tones 

were 130 with moderate variability and reactive. She did have a decelration in 

triage for 5 minutes, resolved with position changes.  After this deceleration, 

with the contractions and her being after 40 weeks, I admitted this patient for 

delivery.





Review of Systems


All systems: negative


Constitutional: Denies chills, Denies fever


Eyes: denies blurred vision, denies pain


Ears, nose, mouth and throat: Denies headache, Denies sore throat


Cardiovascular: Denies chest pain, Denies shortness of breath


Respiratory: Denies cough


Gastrointestinal: Denies abdominal pain, Denies diarrhea, Denies nausea, Denies 

vomiting


Genitourinary: Denies dysuria, Denies hematuria


Musculoskeletal: Denies myalgias


Integumentary: Denies pruritus, Denies rash


Neurological: Denies numbness, Denies weakness


Psychiatric: Denies anxiety, Denies depression


Endocrine: Denies fatigue, Denies weight change





Past Medical History


Past Medical History: No Reported History


History of Any Multi-Drug Resistant Organisms: None Reported


Past Surgical History: No Surgical Hx Reported


Past Anesthesia/Blood Transfusion Reactions: No Reported Reaction


Past Psychological History: Anxiety, Bipolar, Depression


Smoking Status: Current every day smoker


Past Alcohol Use History: None Reported


Past Drug Use History: Marijuana





- Past Family History


  ** Mother


Family Medical History: No Reported History





Medications and Allergies


                                    Allergies











Allergy/AdvReac Type Severity Reaction Status Date / Time


 


No Known Allergies Allergy   Verified 22 22:30














Exam


Osteopathic Statement: *.  No significant issues noted on an osteopathic 

structural exam other than those noted in the History and Physical/Consult.


                                   Vital Signs











  Temp Pulse Resp BP Pulse Ox


 


 22 23:30  96.8 F L  83  18  133/79  100








                                Intake and Output











 22





 14:59 22:59 06:59


 


Other:   


 


  # Voids   4


 


  Weight  61.235 kg 61.235 kg














Heart: Regular rate and rhythm


Lungs: Clear to auscultation bilaterally


Abdomen: Soft, nontender


Extremities: Negative Homans sign





Results


Result Diagrams: 


                                 22 22:55





                  Abnormal Lab Results - Last 24 Hours (Table)











  22 Range/Units





  22:55 


 


WBC  12.8 H  (3.8-10.6)  k/uL


 


Neutrophils #  10.0 H  (1.3-7.7)  k/uL














Assessment and Plan


(1) Normal labor


Current Visit: Yes   Status: Acute   Code(s): O80 - ENCOUNTER FOR FULL-TERM 

UNCOMPLICATED DELIVERY; Z37.9 - OUTCOME OF DELIVERY, UNSPECIFIED   SNOMED 

Code(s): 82235006


   





(2) 40 weeks gestation of pregnancy


Current Visit: Yes   Status: Acute   Code(s): Z3A.40 - 40 WEEKS GESTATION OF 

PREGNANCY   SNOMED Code(s): 47679684


   





(3) Abnormality in fetal heart rate during labor


Current Visit: Yes   Status: Acute   Code(s): BOW6763 -    SNOMED Code(s): 

924442965


   


Plan: 





1. admit to L&D


2. expectant management with pitocin augmentation if necessary


3. anticipate normal vaginal delivery

## 2022-05-25 VITALS — RESPIRATION RATE: 18 BRPM

## 2022-05-25 VITALS — HEART RATE: 73 BPM | SYSTOLIC BLOOD PRESSURE: 121 MMHG | DIASTOLIC BLOOD PRESSURE: 78 MMHG | TEMPERATURE: 98.1 F

## 2022-05-25 LAB
BASOPHILS # BLD AUTO: 0 K/UL (ref 0–0.2)
BASOPHILS NFR BLD AUTO: 0 %
EOSINOPHIL # BLD AUTO: 0.1 K/UL (ref 0–0.7)
EOSINOPHIL NFR BLD AUTO: 1 %
ERYTHROCYTE [DISTWIDTH] IN BLOOD BY AUTOMATED COUNT: 3.58 M/UL (ref 3.8–5.4)
ERYTHROCYTE [DISTWIDTH] IN BLOOD: 13.1 % (ref 11.5–15.5)
HCT VFR BLD AUTO: 34.3 % (ref 34–46)
HGB BLD-MCNC: 11.2 GM/DL (ref 11.4–16)
LYMPHOCYTES # SPEC AUTO: 1.8 K/UL (ref 1–4.8)
LYMPHOCYTES NFR SPEC AUTO: 15 %
MCH RBC QN AUTO: 31.4 PG (ref 25–35)
MCHC RBC AUTO-ENTMCNC: 32.7 G/DL (ref 31–37)
MCV RBC AUTO: 95.9 FL (ref 80–100)
MONOCYTES # BLD AUTO: 0.7 K/UL (ref 0–1)
MONOCYTES NFR BLD AUTO: 6 %
NEUTROPHILS # BLD AUTO: 9.3 K/UL (ref 1.3–7.7)
NEUTROPHILS NFR BLD AUTO: 76 %
PLATELET # BLD AUTO: 219 K/UL (ref 150–450)
WBC # BLD AUTO: 12.2 K/UL (ref 3.8–10.6)

## 2022-05-25 RX ADMIN — ACETAMINOPHEN PRN MG: 325 TABLET, FILM COATED ORAL at 02:46

## 2022-05-25 RX ADMIN — IBUPROFEN PRN MG: 600 TABLET ORAL at 13:06

## 2022-05-25 RX ADMIN — IBUPROFEN PRN MG: 600 TABLET ORAL at 06:42

## 2022-05-25 RX ADMIN — ACETAMINOPHEN PRN MG: 325 TABLET, FILM COATED ORAL at 08:49

## 2022-05-25 RX ADMIN — DOCUSATE SODIUM AND SENNOSIDES SCH EACH: 50; 8.6 TABLET ORAL at 08:49

## 2022-05-25 NOTE — P.MSEPDOC
Presenting Problems





- Arrival Data


Date of Arrival on Unit: 22


Time of Arrival on Unit: 22:29


Mode of Transport: Wheelchair





- Complaint


OB-Reason for Admission/Chief Complaint: Possible Onset of Labor





Prenatal Medical History





- Pregnancy Information


: 3


Para: 2


Term: 2


: 0


Abortions: Spontaneous or Elective: 0


Number of Living Children: 2





- Gestational Age


Gestational Age by PERLA (wks/days): 40 Weeks and 2 Days





- Prenatal History


Pregnancy Complications: Smoker





Review of Systems





- Review of Systems


Constitutional: No problems


Breast: No problems


ENT: No problems


Cardiovascular: No problems


Respiratory: No problems


Gastrointestinal: No problems


Genitourinary: No problems


Musculoskeletal: No problems


Neurological: No problems


Skin: No problems





Vital Signs





- Temperature


Temperature: 98.2 F


Temperature Source: Oral





- Pulse


  ** Right Pulse Oximetery


Pulse Rate: 86


Pulse Assessment Method: Pulse Oximetry





- Respirations


Respiratory Rate: 16


Oxygen Delivery Method: Room Air


O2 Sat by Pulse Oximetry: 97





- Blood Pressure


  ** Right Arm


Blood Pressure: 134/64


Blood Pressure Mean: 87


Blood Pressure Source: Automatic Cuff





Medical Screen Scoring





- Cervical Exam


Dilation (cm): 1


Effacement (%): 60


Station: -3


Membranes: Intact





- Uterine Contractions


Frequency From (mins): 1


Frequency To (mins): 4


Duration From (seconds): 50


Duration To (seconds): 120


Intensity: Mild


Resting: Soft to palpation





- Fetal Assessment - Baby A


Baseline FHR: 145


Fetal Heart Rate - NICHD Category: Category II (Indeterminate)


NST: Reactive





Physician Notification





- Physician Notified


Physician Notified Date: 22


Physician Notified Time: 23:03


Physician: Mandy Prince


New Order Received: Yes





- Notification Comment


Comment: Dr. Prince notified of pt's arrival to triage, report given including 

FHT's. Resuscitative measures discussed. POC discussed. Pt to be admitted for 

labor, management as expected unless otherwise needed.





Maternal Fetal Triage Index





- Maternal Fetal Triage Index


Presenting for scheduled procedure w/no complaint: No





- Stat/Priority 1


Stat Priority 1: No





- Urgent/Priority 2


Urgent Priority 2: No





- Prompt/Priority 3


Prompt Priority 3: No





- Non-Urgent/Priority 4


Non-Urgent Priority 4: Yes


Criteria Met for Priority 4: Pt is a  with PERLA 22 here at 40.2 weeks of

gestation with c/o UC's x4 hours. Pt reports UC's started around 6pm and got 

increasing painful.





Disposition





- Disposition


OB Disposition: Admit


Transferred to:: BS 16


Discharge Date: 22


Discharge Time: 23:03


I agree with the RN Medical Screening Exam: Yes


Case reviewed; plan agreed upon as documented in EMR&OBIX.: Yes


Diagnosis: ENCOUNTER FOR FULL-TERM UNCOMPLICATED DELIVERY

## 2022-05-25 NOTE — P.DS
Providers


Date of admission: 


22 22:52





Expected date of discharge: 22


Attending physician: 


Olive Moreno





Primary care physician: 


Stated None





Hospital Course: 





This is a 27-year-old female  3 para 2 with an estimated gestational age 

of 40-3/7 weeks who presented to labor and delivery with complaints of 

contractions.  She is observed in triage and found to have a 5 minute 

deceleration.  She was admitted and observed and then underwent oxytocin 

augmentation of labor the next morning.  She delivered vaginally a viable female

infant with Apgar scores of 9 at 1 minute and 9 at 5 minutes and infant weight 

of 6 lbs. 5 oz.  Nuchal cord times one was noted.  Postpartum course has been 

essentially uncomplicated.  She is breast-feeding.  Lochia is decreasing.  Pain 

is well-controlled other than complaints of back pain.  Vital signs are stable. 

Abdomen is soft with fundus firm and nontender.  Extremities show negative 

Homans.  Impression is status post vaginal delivery postpartum day #1.  Plan is 

to discharge home later today.  Routine postpartum instructions are given.  She 

will be given a prescription for ibuprofen and a breast pump.  She is advised 

follow-up in the office in 6 weeks for postpartum check.  She is advised to call

the office if she has any further questions or concerns prior to her appointment

time.


Procedures: 





Oxytocin augmentation of labor


Spontaneous vaginal delivery of a viable female infant on 2022


Patient Condition at Discharge: Stable





Plan - Discharge Summary


New Discharge Prescriptions: 


New


   Ibuprofen [Motrin] 600 mg PO Q6HR PRN #60 tab


     PRN Reason: Mild Pain (Scale 1 To 3)


Discharge Medication List





Ibuprofen [Motrin] 600 mg PO Q6HR PRN #60 tab 22 [Rx]








Follow up Appointment(s)/Referral(s): 


Olive Moreno DO [Doctor of Osteopathic Medicine] - 22 11:30 am


Activity/Diet/Wound Care/Special Instructions: 


Postpartum Instructions





1.  Do not begin any exercise program for 3 weeks.


2.  Do not resume sexual relations for 3 weeks or longer if uncomfortable.


3.  You may take tub baths or showers at any time.


4.  You may use tampons if desired after 3 weeks.


5.  Keep the area of episiotomy (stitches) clean and dry.


6.  If you are not nursing, wear a good fitting, supportive bra during the day 

and limit fluid intake for at least 1 week to prevent breast engorgement.


7.  Call the office, [phone], within the next week to make appointment for your

6 week checkup if it has not already been made.


8.  Report any of the following occurrences to the doctor promptly:


     a.  Heavy, excessive bleeding


     b.  Chills, fever


     c.  Burning or frequency of urination


     d.  Pain or redness and breasts if nursing


     e.  Increasing pain or swelling in episiotomy (stitches).











In addition to the above instructions, the following additional should be 

followed:


1.  No heavy lifting or straining (exercising) until after 6 week checkup.


2.  Keep abdominal incision clean and dry: You may wear a dressing if more c

omfortable.


3.  Make office appointment for 10 days after going home or as instructed by her

doctor.


Discharge Disposition: HOME SELF-CARE